# Patient Record
Sex: MALE | Race: WHITE | Employment: FULL TIME | URBAN - METROPOLITAN AREA
[De-identification: names, ages, dates, MRNs, and addresses within clinical notes are randomized per-mention and may not be internally consistent; named-entity substitution may affect disease eponyms.]

---

## 2017-07-21 PROBLEM — E87.1 HYPONATREMIA: Status: ACTIVE | Noted: 2017-07-21

## 2017-07-21 PROBLEM — E55.9 HYPOVITAMINOSIS D: Status: ACTIVE | Noted: 2017-07-21

## 2017-07-21 PROBLEM — R80.9 PROTEINURIA: Status: ACTIVE | Noted: 2017-07-21

## 2017-07-21 PROBLEM — I10 ESSENTIAL HYPERTENSION: Status: ACTIVE | Noted: 2017-07-21

## 2017-07-21 PROBLEM — Z94.0 KIDNEY TRANSPLANT STATUS: Status: ACTIVE | Noted: 2017-07-21

## 2017-07-21 PROBLEM — E87.5 HYPERKALEMIA: Status: ACTIVE | Noted: 2017-07-21

## 2017-07-21 PROBLEM — E78.5 HYPERLIPIDEMIA: Status: ACTIVE | Noted: 2017-07-21

## 2017-07-21 PROBLEM — D84.9 IMMUNOSUPPRESSION (HCC): Status: ACTIVE | Noted: 2017-07-21

## 2024-02-22 ENCOUNTER — HOSPITAL ENCOUNTER (EMERGENCY)
Age: 49
Discharge: HOME | End: 2024-02-22
Attending: EMERGENCY MEDICINE
Payer: COMMERCIAL

## 2024-02-22 ENCOUNTER — APPOINTMENT (OUTPATIENT)
Dept: GENERAL RADIOLOGY | Age: 49
End: 2024-02-22
Payer: COMMERCIAL

## 2024-02-22 VITALS
RESPIRATION RATE: 16 BRPM | WEIGHT: 142.42 LBS | OXYGEN SATURATION: 99 % | TEMPERATURE: 97.8 F | BODY MASS INDEX: 22.99 KG/M2 | DIASTOLIC BLOOD PRESSURE: 79 MMHG | HEART RATE: 83 BPM | SYSTOLIC BLOOD PRESSURE: 143 MMHG

## 2024-02-22 DIAGNOSIS — S61.012A LACERATION OF LEFT THUMB WITHOUT FOREIGN BODY, NAIL DAMAGE STATUS UNSPECIFIED, INITIAL ENCOUNTER: Primary | ICD-10-CM

## 2024-02-22 PROCEDURE — 73130 X-RAY EXAM OF HAND: CPT

## 2024-02-22 PROCEDURE — 12001 RPR S/N/AX/GEN/TRNK 2.5CM/<: CPT

## 2024-02-22 PROCEDURE — 6360000002 HC RX W HCPCS

## 2024-02-22 PROCEDURE — 90715 TDAP VACCINE 7 YRS/> IM: CPT

## 2024-02-22 PROCEDURE — 99284 EMERGENCY DEPT VISIT MOD MDM: CPT

## 2024-02-22 PROCEDURE — 6370000000 HC RX 637 (ALT 250 FOR IP)

## 2024-02-22 PROCEDURE — 90471 IMMUNIZATION ADMIN: CPT

## 2024-02-22 RX ORDER — CEPHALEXIN 250 MG/1
500 CAPSULE ORAL ONCE
Status: COMPLETED | OUTPATIENT
Start: 2024-02-22 | End: 2024-02-22

## 2024-02-22 RX ORDER — LIDOCAINE HYDROCHLORIDE 10 MG/ML
20 INJECTION, SOLUTION INFILTRATION; PERINEURAL ONCE
Status: DISCONTINUED | OUTPATIENT
Start: 2024-02-22 | End: 2024-02-22 | Stop reason: HOSPADM

## 2024-02-22 RX ORDER — CEPHALEXIN 500 MG/1
500 CAPSULE ORAL 4 TIMES DAILY
Qty: 28 CAPSULE | Refills: 0 | Status: SHIPPED | OUTPATIENT
Start: 2024-02-22 | End: 2024-02-29

## 2024-02-22 RX ADMIN — CEPHALEXIN 500 MG: 250 CAPSULE ORAL at 17:11

## 2024-02-22 RX ADMIN — TETANUS TOXOID, REDUCED DIPHTHERIA TOXOID AND ACELLULAR PERTUSSIS VACCINE, ADSORBED 0.5 ML: 5; 2.5; 8; 8; 2.5 SUSPENSION INTRAMUSCULAR at 17:11

## 2024-02-22 ASSESSMENT — PAIN - FUNCTIONAL ASSESSMENT: PAIN_FUNCTIONAL_ASSESSMENT: 0-10

## 2024-02-22 ASSESSMENT — PAIN SCALES - GENERAL: PAINLEVEL_OUTOF10: 3

## 2024-02-22 NOTE — ED NOTES
Pt to ED c/o laceration to left thumb. Pt states he cut it on a circular blade. Bleeding is controlled upon arrival. Pt arrives with thumb wrapped in gauze. Pt c/o minimal pain, some throbbing. This was a work injury. No distress noted. Pt alert and oriented x4, talking in complete sentences, respirations even and unlabored. Pt acting age appropriate. White board updated, will continue to plan of care

## 2024-02-22 NOTE — ED PROVIDER NOTES
[facility] ED  Emergency Department Encounter  Emergency Medicine Resident     Pt Name:Juan Pablo Escalona  MRN: [id number]  Birthdate 1975  Date of evaluation: 2/22/24  PCP:  Gunnar Nolasco (Inactive)  Note Started: 4:48 PM EST      CHIEF COMPLAINT       Chief Complaint   Patient presents with    Laceration       HISTORY OF PRESENT ILLNESS  (Location/Symptom, Timing/Onset, Context/Setting, Quality, Duration, Modifying Factors, Severity.)      Juan Pablo Escalona is a 48-year-old male presents to the ED after suffering a laceration at work from a circular saw.  Patient was cutting a piece of wood trim when the piece of wood jumped and his hand caught the blade.  Patient states he got a cut on his left thumb and a cut at the distal end of his thumbnail.  Patient reports that it has been a while since his last tetanus was updated.  He is amenable to getting it updated.  Denies any numbness or tingling distal to the laceration.    PAST MEDICAL / SURGICAL / SOCIAL / FAMILY HISTORY      has a past medical history of Chronic kidney disease, DM (diabetes mellitus) (HCC), Hyperlipidemia, and Hypertension.     has a past surgical history that includes Kidney transplant; Dialysis fistula creation; eye surgery; and hernia repair.    Social History     Socioeconomic History    Marital status:      Spouse name: Not on file    Number of children: Not on file    Years of education: Not on file    Highest education level: Not on file   Occupational History    Not on file   Tobacco Use    Smoking status: Every Day     Types: Cigarettes    Smokeless tobacco: Never   Vaping Use    Vaping Use: Never used   Substance and Sexual Activity    Alcohol use: No    Drug use: No    Sexual activity: Not on file   Other Topics Concern    Not on file   Social History Narrative    Not on file     Social Determinants of Health     Financial Resource Strain: Not on file   Food Insecurity: Not on file   Transportation Needs: Not on  bodies.  Patient has sensation intact distally.  There is also superficial abrasions at the distal aspect of the nail plate, which is securely in place without any subungual hematoma.     Concern for: Laceration, out of date tetanus, foreign body in laceration given worksite location    Plan: Will obtain x-ray imaging of the left hand to rule any foreign body.  Will update tetanus.  Will closed the wound in start patient on oral antibiotics as this is a dirty wound.   [AS]   1737 Had nursing wash the wound.  After reassessment, the wound can likely be closed with Dermabond. [AS]   1740 X-ray reviewed by resident and attending.  No foreign body seen.  Will close with Dermabond [AS]   1754 Wound cleaned with chlorhexidine and Dermabond applied.  Patient tolerated procedure.    Confirmed with pharmacy that Keflex will not be an issue for patient's history of kidney transplant. Patient reports that he is compliant with all of his antirejection medication. [AS]      ED Course User Index  [AS] Wu Banerjee DO       PROCEDURES:      CONSULTS:  None    CRITICAL CARE:  There was significant risk of life threatening deterioration of patient's condition requiring my direct management. Critical care time minutes, excluding any documented procedures.    FINAL IMPRESSION      1. Laceration of left thumb without foreign body, nail damage status unspecified, initial encounter          DISPOSITION / PLAN     DISPOSITION Decision To Discharge 02/22/2024 05:47:18 PM      PATIENT REFERRED TO:  No follow-up provider specified.    DISCHARGE MEDICATIONS:  Discharge Medication List as of 2/22/2024  5:59 PM        START taking these medications    Details   cephALEXin (KEFLEX) 500 MG capsule Take 1 capsule by mouth 4 times daily for 7 days, Disp-28 capsule, R-0Normal             Wu Banerjee DO  Emergency Medicine Resident    (Please note that portions of this note were completed with a voice recognition program.  Efforts were made

## 2024-02-22 NOTE — DISCHARGE INSTRUCTIONS
-You were seen and evaluated by emergency medicine physicians at [facility].    -Please follow-up with your primary care physician and/or with the referrals to specialist.    -You were diagnosed with: Finger laceration    -No foreign body noted on x-ray.  -Laceration closed with surgical glue/Dermabond.  -Please follow the wound care instructions listed below:  -Wound care instructions:  -Do not submerge the wound in water for a minimum of 2 weeks / 14 days.  This includes bath water, hot tubs, pools, lake water.  There is an increased risk for infection while the wound is healing if submerged.  -Dress the wound daily with clean gauze and bandage.  If the bandages become saturated, change more regularly.  -You can shower, allowing hot water and soap to run over the wound.  Pat dry after showering.  -Monitor for signs of infection including redness, swelling, purulent discharge, numbness, tingling as well as systemic signs such as fever, chills, nausea, vomiting    -You will be discharged with a prescription of antibiotics as the wound is dirty.  Please take as prescribed and to completion.    -Please return to the Emergency Department if you are experiencing the following symptoms acutely: Headache, fever, chills, nausea, vomiting, chest pain, shortness of breath, abdominal pain, change with urination, change with bowel movements, change in your skin/hair/nail, weakness, fatigue, altered mental status and/or any change from baseline health.    -Thank you for coming to [facility].     Admission

## 2024-02-22 NOTE — ED PROVIDER NOTES
[facility] ED  eMERGENCY dEPARTMENT eNCOUnter   Attending Attestation     Pt Name: Juan Pablo Escalona  MRN: [id number]  Birthdate 1975  Date of evaluation: 2/22/24       Juan Pablo Escalona is a 48 y.o. male who presents with Laceration      6:02 PM EST      History: Patient with laceration to the left thumb.  Patient excellently hit his finger with a circular saw.    Exam: Small avulsion of the skin of the distal first digit of the left thumb.  The wound is well-approximated, already clotting.    Plan for Dermabond and discharge after x-ray, tetanus will be updated.  I performed a history and physical examination of the patient and discussed management with the resident. I reviewed the resident’s note and agree with the documented findings and plan of care. Any areas of disagreement are noted on the chart. I was personally present for the key portions of any procedures. I have documented in the chart those procedures where I was not present during the key portions. I have personally reviewed all images and agree with the resident's interpretation. I have reviewed the emergency nurses triage note. I agree with the chief complaint, past medical history, past surgical history, allergies, medications, social and family history as documented unless otherwise noted below. Documentation of the HPI, Physical Exam and Medical Decision Making performed by medical students or scribes is based on my personal performance of the HPI, PE and MDM. For Phys Assistant/ Nurse Practitioner cases/documentation I have had a face to face evaluation of this patient and have completed at least one if not all key elements of the E/M (history, physical exam, and MDM). Additional findings are as noted.    For APC cases I have personally evaluated and examined the patient in conjunction with the APC and agree with the treatment plan and disposition of the patient as recorded by the APC.    Wallace Caraballo MD  Attending Emergency  Physician

## 2024-02-23 ENCOUNTER — HOSPITAL ENCOUNTER (EMERGENCY)
Age: 49
Discharge: HOME | End: 2024-02-23
Attending: EMERGENCY MEDICINE
Payer: COMMERCIAL

## 2024-02-23 VITALS
DIASTOLIC BLOOD PRESSURE: 90 MMHG | SYSTOLIC BLOOD PRESSURE: 156 MMHG | OXYGEN SATURATION: 98 % | RESPIRATION RATE: 17 BRPM | HEART RATE: 84 BPM | TEMPERATURE: 98.8 F

## 2024-02-23 DIAGNOSIS — S61.012A LACERATION OF LEFT THUMB WITHOUT FOREIGN BODY, NAIL DAMAGE STATUS UNSPECIFIED, INITIAL ENCOUNTER: Primary | ICD-10-CM

## 2024-02-23 PROCEDURE — 99282 EMERGENCY DEPT VISIT SF MDM: CPT

## 2024-02-23 PROCEDURE — 12001 RPR S/N/AX/GEN/TRNK 2.5CM/<: CPT

## 2024-02-23 ASSESSMENT — PAIN - FUNCTIONAL ASSESSMENT: PAIN_FUNCTIONAL_ASSESSMENT: NONE - DENIES PAIN

## 2024-02-23 NOTE — DISCHARGE INSTR - COC
Continuity of Care Form    Patient Name: Juan Pablo Escalona   :  1975  MRN:  [id number]    Admit date:  2024  Discharge date:  ***    Code Status Order: No Order   Advance Directives:     Admitting Physician:  No admitting provider for patient encounter.  PCP: Gunnar Nolasco (Inactive)    Discharging Nurse: ***  Discharging Hospital Unit/Room#: 26/H26B  Discharging Unit Phone Number: ***    Emergency Contact:   Extended Emergency Contact Information  Primary Emergency Contact: Jeremias Escalona   [facility]  Home Phone: [phone]  Relation: Spouse    Past Surgical History:  Past Surgical History:   Procedure Laterality Date    DIALYSIS FISTULA CREATION      EYE SURGERY      HERNIA REPAIR      KIDNEY TRANSPLANT         Immunization History:   Immunization History   Administered Date(s) Administered    COVID-19, MODERNA Bivalent, (age 12y+), IM, 50 mcg/0.5 mL 10/22/2022    TDaP, ADACEL (age 10y-64y), BOOSTRIX (age 10y+), IM, 0.5mL 2024       Active Problems:  Patient Active Problem List   Diagnosis Code    Essential hypertension I10    Immunosuppression (HCC) D84.9    Status post kidney transplant Z94.0    Hyperlipidemia E78.5    Hypovitaminosis D E55.9    Hyperkalemia E87.5    Hyponatremia E87.1    Proteinuria R80.9    Uncontrolled type 1 diabetes mellitus with nephropathy [id number]       Isolation/Infection:   Isolation            No Isolation          Patient Infection Status       None to display            Nurse Assessment:  Last Vital Signs: BP (!) 156/90   Pulse 84   Temp 98.8 °F (37.1 °C) (Oral)   Resp 17   SpO2 98%     Last documented pain score (0-10 scale):    Last Weight:   Wt Readings from Last 1 Encounters:   24 64.6 kg (142 lb 6.7 oz)     Mental Status:  {IP PT MENTAL STATUS:}    IV Access:  { HIREN IV ACCESS:337937844}    Nursing Mobility/ADLs:  Walking   {CHP DME ADLs:052659225}  Transfer  {CHP DME ADLs:516849169}  Bathing  {CHP DME ADLs:031270032}  Dressing  {CHP

## 2024-02-23 NOTE — ED PROVIDER NOTES
[facility]     Emergency Department     Faculty Note/ Attestation      Pt Name: Juan Pablo Escalona                                       MRN: [id number]  Birthdate 1975  Date of evaluation: 2/23/2024    Patients PCP:    Gunnar Nolasco (Inactive)    Note Started: 4:05 PM EST      Attestation  I performed a history and physical examination of the patient and discussed management with the resident. I reviewed the resident’s note and agree with the documented findings and plan of care. Any areas of disagreement are noted on the chart. I was personally present for the key portions of any procedures. I have documented in the chart those procedures where I was not present during the key portions. I have reviewed the emergency nurses triage note. I agree with the chief complaint, past medical history, past surgical history, allergies, medications, social and family history as documented unless otherwise noted below.    For Physician Assistant/ Nurse Practitioner cases/documentation I have personally evaluated this patient and have completed at least one if not all key elements of the E/M (history, physical exam, and MDM). Additional findings are as noted.      Initial Screens:             Vitals:    Vitals:    02/23/24 1451   BP: (!) 156/90   Pulse: 84   Resp: 17   Temp: 98.8 °F (37.1 °C)   TempSrc: Oral   SpO2: 98%       CHIEF COMPLAINT       Chief Complaint   Patient presents with    Finger Injury     Left thumb injury had glued and still lots of bleeding; workers comp             DIAGNOSTIC RESULTS             RADIOLOGY:   No orders to display         LABS:  Labs Reviewed - No data to display      EMERGENCY DEPARTMENT COURSE:     -------------------------  BP: (!) 156/90, Temp: 98.8 °F (37.1 °C), Pulse: 84, Respirations: 17      Comments    Cut thumb yesterday on saw, seen in ED, glued, abx  Having blding now  Will remove glue, readdress closure    5:18 PM EST  The glue was removed, he did have a

## 2024-02-23 NOTE — ED NOTES
Pt. Arrives to ED via private auto for c/o continuing finger pain.  Pt was seen here yesterday for finger injury from a saw and skin was glued.  Pt states it is still hurting and is continuing to bleed.

## 2024-02-23 NOTE — DISCHARGE INSTRUCTIONS
Go to your primary care physician or return to the emergency department in 7-10 days to have your sutures removed.    Please take the antibiotic previously prescribed to you for the entire course.    You can shower with the laceration after 24 hours, would avoid baths or swimming in lakes / rivers.  Apply bacitracin / triple antibiotic ointment / Neosporin / Vaseline to the wound twice a day.    PLEASE RETURN TO THE EMERGENCY DEPARTMENT IMMEDIATELY for worsening symptoms, redness around the wound or redness streaking up the body part, white drainage from the wound, or if you develop any concerning symptoms such as: high fever not relieved by acetaminophen (Tylenol) and/or ibuprofen (Motrin / Advil), chills, shortness of breath, chest pain, feeling of your heart fluttering or racing, persistent nausea and/or vomiting, vomiting up blood, blood in your stool, numbness, loss of consciousness, weakness or tingling in the arms or legs or change in color of the extremities, changes in mental status, persistent headache, blurry vision, loss of bladder / bowel control, unable to follow up with your physician, or other any other care or concern.

## 2024-02-24 NOTE — ED PROVIDER NOTES
[facility] ED  Emergency Department Encounter  Emergency Medicine Resident     Pt Name:Juan Pablo Escalona  MRN: [id number]  Birthdate 1975  Date of evaluation: 2/23/24  PCP:  Gunnar Nolasco (Inactive)  Note Started: 10:45 PM EST      CHIEF COMPLAINT       Chief Complaint   Patient presents with    Finger Injury     Left thumb injury had glued and still lots of bleeding; workers comp       HISTORY OF PRESENT ILLNESS  (Location/Symptom, Timing/Onset, Context/Setting, Quality, Duration, Modifying Factors, Severity.)      Juan Pablo Escalona is a 48 y.o. male who presents with left thumb injury and bleeding.  Patient reports that yesterday he injured his thumb on a circular saw and came to the ED to have it repaired with glue.  He reports overnight he had significant bleeding from the wound.  He reports that the pain is similar to previous.  He reports he with some Band-Aids on it to hold pressure and it has not been bleeding this morning but he was concerned that it might need further repair.  He denies fevers or chills,, redness or swelling to the injury.  He reports he has been taking the antibiotic that has been prescribed.    PAST MEDICAL / SURGICAL / SOCIAL / FAMILY HISTORY      has a past medical history of Chronic kidney disease, DM (diabetes mellitus) (HCC), Hyperlipidemia, and Hypertension.     has a past surgical history that includes Kidney transplant; Dialysis fistula creation; eye surgery; and hernia repair.    Social History     Socioeconomic History    Marital status:      Spouse name: Not on file    Number of children: Not on file    Years of education: Not on file    Highest education level: Not on file   Occupational History    Not on file   Tobacco Use    Smoking status: Every Day     Types: Cigarettes    Smokeless tobacco: Never   Vaping Use    Vaping Use: Never used   Substance and Sexual Activity    Alcohol use: No    Drug use: No    Sexual activity: Not on file   Other Topics  tablet by mouth daily    Provider, MD Kristi     REVIEW OF SYSTEMS       See HPI    PHYSICAL EXAM      INITIAL VITALS:   BP (!) 156/90   Pulse 84   Temp 98.8 °F (37.1 °C) (Oral)   Resp 17   SpO2 98%     Physical Exam  Vitals reviewed.   Eyes:      Conjunctiva/sclera: Conjunctivae normal.   Cardiovascular:      Comments: Left radial pulse within normal limits  Pulmonary:      Effort: Pulmonary effort is normal.   Musculoskeletal:         General: Tenderness (To tip of left thumb) present. No swelling or deformity.        Hands:       Comments: Approximately 1 cm laceration noted to the left thumb, no apparent nailbed damage, glue is intact but has significant amount of blood underlying that appears trapped   Skin:     General: Skin is warm and dry.   Neurological:      General: No focal deficit present.      Mental Status: He is alert.           DDX/DIAGNOSTIC RESULTS / EMERGENCY DEPARTMENT COURSE / MDM     Medical Decision Making  Patient is a 48-year-old male presenting due to bleeding from a laceration he sustained yesterday that was dermabonded shut.  Upon examination patient is saturating on room air, appears in no acute distress and has good capillary refill to the thumb in question.  Upon examination it appears there is some trapped blood underneath the Dermabond and that the wound has dehisced somewhat beneath the Dermabond.  No acute infection appears to be present but will remove Dermabond and possibly place sutures.  Patient did have an x-ray yesterday that was negative for foreign body or acute fracture.  He also received a tetanus update yesterday.  Will likely advise the patient continue his antibiotics after repair with suture.        PROCEDURES:  Lac Repair    Date/Time: 2/23/2024 10:46 PM    Performed by: Ana Malone MD  Authorized by: Darío Warren MD    Consent:     Consent obtained:  Verbal  Laceration details:     Location:  Finger    Finger location:  L thumb    Length